# Patient Record
Sex: MALE | Race: WHITE
[De-identification: names, ages, dates, MRNs, and addresses within clinical notes are randomized per-mention and may not be internally consistent; named-entity substitution may affect disease eponyms.]

---

## 2020-07-04 ENCOUNTER — HOSPITAL ENCOUNTER (INPATIENT)
Dept: HOSPITAL 19 - COL.ER | Age: 75
LOS: 1 days | Discharge: ANOTHER HOSPITAL | DRG: 536 | End: 2020-07-05
Attending: STUDENT IN AN ORGANIZED HEALTH CARE EDUCATION/TRAINING PROGRAM | Admitting: STUDENT IN AN ORGANIZED HEALTH CARE EDUCATION/TRAINING PROGRAM
Payer: COMMERCIAL

## 2020-07-04 VITALS — BODY MASS INDEX: 24.78 KG/M2 | HEIGHT: 70 IN | WEIGHT: 173.06 LBS

## 2020-07-04 VITALS — TEMPERATURE: 98.5 F | DIASTOLIC BLOOD PRESSURE: 88 MMHG | SYSTOLIC BLOOD PRESSURE: 158 MMHG | HEART RATE: 98 BPM

## 2020-07-04 VITALS — SYSTOLIC BLOOD PRESSURE: 147 MMHG | DIASTOLIC BLOOD PRESSURE: 72 MMHG | HEART RATE: 100 BPM | TEMPERATURE: 98 F

## 2020-07-04 VITALS — TEMPERATURE: 97.6 F | DIASTOLIC BLOOD PRESSURE: 64 MMHG | SYSTOLIC BLOOD PRESSURE: 145 MMHG

## 2020-07-04 VITALS — TEMPERATURE: 98.4 F | DIASTOLIC BLOOD PRESSURE: 84 MMHG | HEART RATE: 93 BPM | SYSTOLIC BLOOD PRESSURE: 138 MMHG

## 2020-07-04 VITALS — HEART RATE: 83 BPM | DIASTOLIC BLOOD PRESSURE: 58 MMHG | TEMPERATURE: 99.8 F | SYSTOLIC BLOOD PRESSURE: 118 MMHG

## 2020-07-04 DIAGNOSIS — M06.9: ICD-10-CM

## 2020-07-04 DIAGNOSIS — Z88.6: ICD-10-CM

## 2020-07-04 DIAGNOSIS — S72.141A: Primary | ICD-10-CM

## 2020-07-04 DIAGNOSIS — F17.210: ICD-10-CM

## 2020-07-04 DIAGNOSIS — I27.20: ICD-10-CM

## 2020-07-04 DIAGNOSIS — J44.9: ICD-10-CM

## 2020-07-04 LAB
ALBUMIN SERPL-MCNC: 3.7 GM/DL (ref 3.5–5)
ALP SERPL-CCNC: 97 U/L (ref 50–136)
ALT SERPL-CCNC: 15 U/L (ref 4–49)
ANION GAP SERPL CALC-SCNC: 6 MMOL/L (ref 7–16)
AST SERPL-CCNC: 23 U/L (ref 15–37)
BASOPHILS # BLD: 0.1 10*3/UL (ref 0–0.2)
BASOPHILS NFR BLD AUTO: 0.9 % (ref 0–2)
BILIRUB SERPL-MCNC: 0.6 MG/DL (ref 0–1)
BUN SERPL-MCNC: 10 MG/DL (ref 9–20)
CALCIUM SERPL-MCNC: 8.5 MG/DL (ref 8.4–10.2)
CHLORIDE SERPL-SCNC: 101 MMOL/L (ref 98–107)
CO2 SERPL-SCNC: 26 MMOL/L (ref 22–30)
CREAT SERPL-SCNC: 0.78 UMOL/L (ref 0.66–1.25)
EOSINOPHIL # BLD: 0.1 10*3/UL (ref 0–0.7)
EOSINOPHIL NFR BLD: 1.1 % (ref 0–4)
ERYTHROCYTE [DISTWIDTH] IN BLOOD BY AUTOMATED COUNT: 12.8 % (ref 11.5–14.5)
GLUCOSE SERPL-MCNC: 100 MG/DL (ref 74–106)
GRANULOCYTES # BLD AUTO: 78 % (ref 42.2–75.2)
HCT VFR BLD AUTO: 46.8 % (ref 42–52)
HGB BLD-MCNC: 15.8 G/DL (ref 13.5–18)
INR BLD: 1 (ref 0.8–3)
LYMPHOCYTES # BLD: 1.3 10*3/UL (ref 1.2–3.4)
LYMPHOCYTES NFR BLD: 13.8 % (ref 20–51)
MCH RBC QN AUTO: 31 PG (ref 27–31)
MCHC RBC AUTO-ENTMCNC: 34 G/DL (ref 33–37)
MCV RBC AUTO: 91 FL (ref 80–100)
MONOCYTES # BLD: 0.5 10*3/UL (ref 0.1–0.6)
MONOCYTES NFR BLD AUTO: 5.6 % (ref 1.7–9.3)
NEUTROPHILS # BLD: 7.3 10*3/UL (ref 1.4–6.5)
PLATELET # BLD AUTO: 132 K/MM3 (ref 130–400)
PMV BLD AUTO: 8.8 FL (ref 7.4–10.4)
POTASSIUM SERPL-SCNC: 4.5 MMOL/L (ref 3.4–5)
PROT SERPL-MCNC: 7.2 GM/DL (ref 6.4–8.2)
PROTHROMBIN TIME: 11.4 SECONDS (ref 9.7–12.8)
RBC # BLD AUTO: 5.17 M/MM3 (ref 4.2–5.6)
SODIUM SERPL-SCNC: 134 MMOL/L (ref 137–145)

## 2020-07-04 NOTE — NUR
Plan: To return home with girlfriend Yoel [phone] as care support and
EMR. Patient did indicated his daughter Pratik as his DPOA, however he did
not have a phone number for her, she lives in [place] per patient. HE
currently resides in [facility].
Assess: SW met with patient at his bedside. Patient denied the use of DME and
indicated [facility] Patient reported that he has
an appointment comng up either on the 20thor the 22nd of July. Patient
reported that [facility] the [facility] with no concerns. Patient
denied a current need for HHS at this time. Surgery is scheduled for tomorrow.
Action: NO current needs at this time. SW will cotninue to follow to determine
provider recommendations after surgery. Patient was educated about community
resources.

## 2020-07-04 NOTE — NUR
Patient resting in bed. Traction applied to right leg, patient reporting a
slight decrease in pain. VSS. 1L NC O2. No reported SOB. Patient had 1 episode
of emesis r/t increased pain in right hip. Patient NPO after midnight for
planned procedure pending ECHO. Patient aware. No further needs expressed from
patient. Call light within reach

## 2020-07-04 NOTE — NUR
Pt reports 8/10 pain to right leg. Has 10# goyal traction on. Medicated with
Dilaudid 1mg IVP to right arm IV site, fluids infusing without problem. Using
urinal at bedside without problem.

## 2020-07-04 NOTE — NUR
SPOKE WITH DR SOLORZANO, INFORMED ECHOCARDIOGRAM WAS COMPLETED. HE ADVISED TO
NOTIFY CARDIOLOGY TO HAVE IT READ.

## 2020-07-04 NOTE — NUR
Patient to room 328 from ED. Nursex2 assist patient from cart onto bed with
board. Patient A&Ox4. Reporting pain in right hip 8/10. Pain medication
requested. VSS 1L NC O2. No reported SOB. Nurse oriented patient to room,
bed and call light. Assessment complete. SAMANTHA hose left leg/SCD. Right leg
elevated on pillow. No further needs expressed from patient. Call light within
reach. Bed alarm on

## 2020-07-04 NOTE — NUR
MEDICATED WITH DILAUDID 1MG IVP FOR PAIN TO RIGHT HIP 10/10 ALSO GIVEN ZOFRAN
4MG IVP NOW. TRACTION ON.

## 2020-07-04 NOTE — NUR
SPOKE WITH DR ROMERO REGARDING ECHOCARDIOGRAM FOR CLEARANCE FOR HIP SURGERY
IN AM. HE WILL READ IT TONIGHT OR FIRST THING IN THE AM.

## 2020-07-05 VITALS — HEART RATE: 90 BPM | TEMPERATURE: 98.3 F | SYSTOLIC BLOOD PRESSURE: 123 MMHG | DIASTOLIC BLOOD PRESSURE: 74 MMHG

## 2020-07-05 VITALS — TEMPERATURE: 98.1 F | DIASTOLIC BLOOD PRESSURE: 68 MMHG | HEART RATE: 87 BPM | SYSTOLIC BLOOD PRESSURE: 128 MMHG

## 2020-07-05 VITALS — HEART RATE: 74 BPM | TEMPERATURE: 98.1 F | DIASTOLIC BLOOD PRESSURE: 56 MMHG | SYSTOLIC BLOOD PRESSURE: 125 MMHG

## 2020-07-05 LAB
ANION GAP SERPL CALC-SCNC: 5 MMOL/L (ref 7–16)
BASOPHILS # BLD: 0 10*3/UL (ref 0–0.2)
BASOPHILS NFR BLD AUTO: 0.2 % (ref 0–2)
BUN SERPL-MCNC: 13 MG/DL (ref 9–20)
CALCIUM SERPL-MCNC: 8.2 MG/DL (ref 8.4–10.2)
CHLORIDE SERPL-SCNC: 98 MMOL/L (ref 98–107)
CO2 SERPL-SCNC: 27 MMOL/L (ref 22–30)
CREAT SERPL-SCNC: 0.66 UMOL/L (ref 0.66–1.25)
EOSINOPHIL # BLD: 0 10*3/UL (ref 0–0.7)
EOSINOPHIL NFR BLD: 0.1 % (ref 0–4)
ERYTHROCYTE [DISTWIDTH] IN BLOOD BY AUTOMATED COUNT: 12.7 % (ref 11.5–14.5)
GLUCOSE SERPL-MCNC: 99 MG/DL (ref 74–106)
GRANULOCYTES # BLD AUTO: 86.2 % (ref 42.2–75.2)
HCT VFR BLD AUTO: 45 % (ref 42–52)
HGB BLD-MCNC: 15.1 G/DL (ref 13.5–18)
LYMPHOCYTES # BLD: 0.8 10*3/UL (ref 1.2–3.4)
LYMPHOCYTES NFR BLD: 6.9 % (ref 20–51)
MCH RBC QN AUTO: 31 PG (ref 27–31)
MCHC RBC AUTO-ENTMCNC: 34 G/DL (ref 33–37)
MCV RBC AUTO: 92 FL (ref 80–100)
MONOCYTES # BLD: 0.7 10*3/UL (ref 0.1–0.6)
MONOCYTES NFR BLD AUTO: 6.2 % (ref 1.7–9.3)
NEUTROPHILS # BLD: 9.6 10*3/UL (ref 1.4–6.5)
PLATELET # BLD AUTO: 120 K/MM3 (ref 130–400)
PMV BLD AUTO: 8.9 FL (ref 7.4–10.4)
POTASSIUM SERPL-SCNC: 4.7 MMOL/L (ref 3.4–5)
RBC # BLD AUTO: 4.89 M/MM3 (ref 4.2–5.6)
SODIUM SERPL-SCNC: 129 MMOL/L (ref 137–145)

## 2020-07-05 NOTE — NUR
Patient laying in bed, right leg in traction and tolerating well. Stated that
the pain has decreased. Right leg SAMANTHA hose, Left leg SCD. VSS 1L NC O2, no
reported SOB. VSS. IV CDI. Patient NPO for procedure. No further needs
expresed from patient. Call light within reach

## 2020-07-05 NOTE — NUR
Reports sleeping off and on. Pain to right leg continues 8/10, reports better
pain relief with Dilaudid, medicated at this time with 1mg IVP.

## 2020-07-05 NOTE — NUR
PT REPORTS SLEEPING SOME, PAIN TO RIGHT LEG 8/10, MEDICATED WITH DILAUDID 1MG
IVP AT THIS TIME. IS NPO FOR POSSIBLE PROCEDURE TODAY.

## 2020-07-05 NOTE — NUR
SPOKE WITH DR MCKEON REGARDING CONSULT FOR PULMONARY HTN AND PREOP
CLEARANCE. HE STATED HE WOULD SEE THE PATIENT THIS MORNING.

## 2020-07-05 NOTE — NUR
Patient transferred by EMS to [facility]. Patient A&Ox3. VSS 1L NC O2, no
reported SOB. Discharge packet and personal belongings with patient. Report
called to accepting nurse. No further needs expressed from patient.

## 2021-06-14 NOTE — NUR
Pt arrived to medical unit room 318 from ED at this time. Oriented pt to room.
Completed admission assessment and updated med rec. Productive cough noted, pt
reports this started a few weeks ago. Also reports SOA and diarrhea. Denies
fevers, afebrile at this time. Rapid COVID test done and found negative. Pt
wearing 2 lpm O2 NC. Noted to be doing pursed lip breathing. Lungs coarse to
auscultation. Denies needs. Call light in reach.

## 2021-06-14 NOTE — NUR
Initial shift assessment done- denies pain-- wearing the o2 at 2L/nc, denies
SOB, states having a cough- cough medicine given as ordered, IV fluids of NS
at 75cc/hr

## 2021-06-15 NOTE — NUR
No issues through the day, finished the day on RA. States he ambulated in
halls well, some SOB towards the end. No pain, hopeful for discharge tomorrow.
Call light within reach.

## 2021-06-15 NOTE — NUR
Pt assessment complete. Pt feeling much better, states the chest
pressure/tightness has improved. SOB has improved, wore O2 for comfort
overnight. He denies any pain. No N/V. Requests shower for later today. No
further needs.

## 2021-06-16 NOTE — NUR
attended clinical rounds with the team and patient is ready for
discharge today. SW collaborated with RT who advised patient qualifies for 2
liters of oxygen with ambulation. SW met with patient to discuss discharge
planning. Patient lives in [place], [place] however is here in [place] visiting his
friend, Yoel (ph#[phone]). Patient goes to the [facility] for
primary care and advised his primary care physician is Dr. Alannah Navarrete.
Patient advised he normally obtains medications from the [facility], however
would like today's prescriptions sent to [facility] on [address] as he will be
in [place] for a couple more days until returning to [place]. SW notified JUAN Green that medications will need to be sent to [facility] on [address]. Patient
states he occasionally uses a cane and is independent with ADLS. Patient
states he believes he has completed DPOA-HC which designates his ex-wife,
Beverly and oldest daughter, Tamica. Patient states he plans to return to his
friend Yoel's home today, then drive back to [place], [place] in the next couple
days. ABDIRIZAK advised she would work on setting up home oxygen.
 
ABDIRIZAK contacted Ervin Norris at the [facility] oxygen clinic who advised that
the [facility] cannot make arrangements for patient, it would have to be done
through the [facility] as he receives primary care there. ABDIRIZAK contacted MARK Robbins at the [facility] and faxed oxygen referral/orders to fax#[phone]. ABDIRIZAK
followed up with Itzel who advised she received orders and is working with
Annette Disla,  for the [facility] to see what they can get
done today. SW was then contacted by [facility] travel oxygen coordinator
(ph#[phone]) who advised she has been in contact with Monica out of
[place] to coordinate obtaining oxygen to get patient home on. ABDIRIZAK was then
contacted by Monica Méndez Coordinator (ph#[phone]) who advised she is
working on obtaining oxygen to get patient home on. Honey reports that she will
have tanks delivered to patient's hospital room tonight at 1700 to get him
[facility]. ABDIRIZAK provided Honey with the hospital's address and patient's
contact information.  Honey states she will have a travel concentrator shipped
out tonight to be delivered to patient tomorrow. Honey states she will call
patient tomorrow once the travel concentrator is in. ABDIRIZAK then followed up with
MARK Robbins at the [facility] who advised they have what they need to get patient
set up with his home oxygen supplies. Itzel states they will contact patient
to coordinate a time for set up once he is home. ABDIRIZAK reviewed this plan with
patient who verbalized understanding and is agreeable to this plan. Patient
states he will make sure he answers his phone to be able to coordinate with
the oxygen providers. Patient states he is ready to get out of the hospital.
ABDIRIZAK notified RN and Hospitalist that tanks will be delivered tonight at 1700.
 
Discharge Plan: Return tonight to Yoel vizcarra's home in [place] with
tanks of oxygen. Once [facility] delivers patient's travel concentrator to patient
locally, he will drive home to [place], [place] where the [facility] will set up his
home oxygen.

## 2021-06-16 NOTE — NUR
Discharge instructions discussed with patient. No questions verbalized. INT
discontinued. He is waiting for his oxygen so he can go home. His friend will
pick him up once his O2 has been delivered. Explained to make follow up
appointment as ordered. He verbalized understanding. He packed up all his
belongings and go dressed. No other changes at this time. Call light within
reach.

## 2022-11-17 NOTE — NUR
Patient is doing well this morning. He got his medications late because he was
worked up about getting his oxygen from the [facility]. He is upset because
they are not working with him like he wants and he has been on the phone.
Patint will discharge home when he gets his oxygen tanks. No other changes at
this time. Call light within reach. DISPLAY PLAN FREE TEXT